# Patient Record
Sex: FEMALE | Race: WHITE | Employment: UNEMPLOYED | URBAN - METROPOLITAN AREA
[De-identification: names, ages, dates, MRNs, and addresses within clinical notes are randomized per-mention and may not be internally consistent; named-entity substitution may affect disease eponyms.]

---

## 2022-08-30 ENCOUNTER — OFFICE VISIT (OUTPATIENT)
Dept: URGENT CARE | Facility: CLINIC | Age: 67
End: 2022-08-30
Payer: MEDICARE

## 2022-08-30 VITALS
SYSTOLIC BLOOD PRESSURE: 123 MMHG | DIASTOLIC BLOOD PRESSURE: 77 MMHG | HEIGHT: 63 IN | OXYGEN SATURATION: 98 % | WEIGHT: 110.19 LBS | HEART RATE: 65 BPM | BODY MASS INDEX: 19.52 KG/M2 | RESPIRATION RATE: 12 BRPM | TEMPERATURE: 98 F

## 2022-08-30 DIAGNOSIS — R22.1 LUMP ON NECK: Primary | ICD-10-CM

## 2022-08-30 PROCEDURE — 99213 PR OFFICE/OUTPT VISIT, EST, LEVL III, 20-29 MIN: ICD-10-PCS | Mod: S$GLB,,,

## 2022-08-30 PROCEDURE — 99213 OFFICE O/P EST LOW 20 MIN: CPT | Mod: S$GLB,,,

## 2022-08-30 NOTE — PROGRESS NOTES
"Subjective:       Patient ID: Celestina Powers is a 67 y.o. female.    Vitals:  height is 5' 2.5" (1.588 m) and weight is 50 kg (110 lb 3.2 oz). Her oral temperature is 97.8 °F (36.6 °C). Her blood pressure is 123/77 and her pulse is 65. Her respiration is 12 and oxygen saturation is 98%.     Chief Complaint: Mass    Pt states "Lump on the right side of the neck x's 2 and a half weeks; no pain or tenderness, just wants it checked out."    Mass  Pertinent negatives include no abdominal pain, chest pain, chills, coughing, diaphoresis, fever, neck pain, sore throat or vertigo.     Constitution: Negative for activity change, appetite change, chills, sweating and fever.   HENT:  Negative for ear pain, sinus pain, sinus pressure and sore throat.    Neck: Negative for neck pain.   Cardiovascular:  Negative for chest pain.   Eyes:  Negative for blurred vision.   Respiratory:  Negative for chest tightness, cough and shortness of breath.    Gastrointestinal:  Negative for abdominal pain.   Skin:  Positive for lesion (right side of neck).   Neurological:  Negative for dizziness and history of vertigo.     Objective:      Physical Exam   Constitutional:  Non-toxic appearance. She does not appear ill. No distress.   Eyes: Conjunctivae are normal. Extraocular movement intact   Cardiovascular: Normal rate, normal heart sounds and normal pulses.   Pulmonary/Chest: Effort normal and breath sounds normal. No respiratory distress.   Neurological: no focal deficit. She is alert.   Skin: Skin is not diaphoretic. Capillary refill takes 2 to 3 seconds.        Psychiatric: Mood normal.     Small painless pea size lump on right side of neck that is mobile. No opening of skin, drainage, erythema or signs of infection noted.   Assessment:       1. Lump on neck          Plan:         Lump on neck  -     Ambulatory referral/consult to Dermatology       Patient presents with small pea sized lump on right side of neck x 2 weeks that is " nonpainful or tender. Referral placed to dermatology for further evaluation and treatment. No indication to I&D wound at this time. Denies recent weight loss or other symptoms associated with lump on neck.

## 2022-08-30 NOTE — PATIENT INSTRUCTIONS
Follow up with dermatology as directed. If unable to get an appointment with dermatology call clinic.     Return to clinic if you develop increased pain, redness or discomfort at site.     Keep area clean.

## 2022-08-31 ENCOUNTER — TELEPHONE (OUTPATIENT)
Dept: DERMATOLOGY | Facility: CLINIC | Age: 67
End: 2022-08-31
Payer: MEDICARE

## 2022-08-31 NOTE — TELEPHONE ENCOUNTER
----- Message from Celestina Farley sent at 8/31/2022 12:37 PM CDT -----  Contact: Pt  Type:  Same Day Appointment Request    Caller is requesting a same day appointment.  Caller declined first available appointment listed below.      Name of Caller:  Pt  When is the first available appointment?  Pt has appt on 12/30  Symptoms:  Cyst on neck  Best Call Back Number:  [phone]  Additional Information:   Please call back.  Thanks.

## 2022-08-31 NOTE — TELEPHONE ENCOUNTER
"Patient notified that was the first available appointment for a new patient and that she was on the "wait list".  She voiced understanding.    "

## 2022-09-22 ENCOUNTER — OFFICE VISIT (OUTPATIENT)
Dept: URGENT CARE | Facility: CLINIC | Age: 67
End: 2022-09-22
Payer: MEDICARE

## 2022-09-22 VITALS
TEMPERATURE: 98 F | WEIGHT: 110.38 LBS | SYSTOLIC BLOOD PRESSURE: 119 MMHG | BODY MASS INDEX: 19.56 KG/M2 | HEART RATE: 60 BPM | OXYGEN SATURATION: 98 % | HEIGHT: 63 IN | RESPIRATION RATE: 18 BRPM | DIASTOLIC BLOOD PRESSURE: 72 MMHG

## 2022-09-22 DIAGNOSIS — H61.21 RIGHT EAR IMPACTED CERUMEN: Primary | ICD-10-CM

## 2022-09-22 PROCEDURE — 69209 EAR CERUMEN REMOVAL: ICD-10-PCS | Mod: RT,S$GLB,, | Performed by: NURSE PRACTITIONER

## 2022-09-22 PROCEDURE — 99213 PR OFFICE/OUTPT VISIT, EST, LEVL III, 20-29 MIN: ICD-10-PCS | Mod: 25,S$GLB,, | Performed by: NURSE PRACTITIONER

## 2022-09-22 PROCEDURE — 99213 OFFICE O/P EST LOW 20 MIN: CPT | Mod: 25,S$GLB,, | Performed by: NURSE PRACTITIONER

## 2022-09-22 PROCEDURE — 69209 REMOVE IMPACTED EAR WAX UNI: CPT | Mod: RT,S$GLB,, | Performed by: NURSE PRACTITIONER

## 2022-09-22 RX ORDER — LORAZEPAM 1 MG/1
1 TABLET ORAL DAILY PRN
COMMUNITY
Start: 2022-08-29

## 2022-09-22 RX ORDER — IBANDRONATE SODIUM 150 MG/1
150 TABLET, FILM COATED ORAL
COMMUNITY
Start: 2022-08-29

## 2022-09-22 NOTE — PROGRESS NOTES
"Subjective:       Patient ID: Celestina Powers is a 67 y.o. female.    Vitals:  height is 5' 2.5" (1.588 m) and weight is 50.1 kg (110 lb 6.4 oz). Her temperature is 98.2 °F (36.8 °C). Her blood pressure is 119/72 and her pulse is 60. Her respiration is 18 and oxygen saturation is 98%.     Chief Complaint: Hearing Problem    Pt c/o feeling like her R ear is clogged x's about 7 days, she states she is having trouble hearing      HENT:  Positive for hearing loss.          Objective:      Physical Exam   Constitutional:  Non-toxic appearance. She does not appear ill. No distress.   HENT:   Head: Normocephalic and atraumatic.   Ears:   Right Ear: External ear normal. There is cerumen present. Decreased hearing is noted. impacted cerumen  Left Ear: External ear normal.   Nose: Nose normal.   Abdominal: Normal appearance.   Neurological: She is alert.   Skin: Skin is not diaphoretic.   Nursing note and vitals reviewed.    Ear Cerumen Removal    Date/Time: 9/22/2022 11:30 AM  Performed by: PAUL Otto  Authorized by: PAUL Otto     Consent Done?:  Yes (Verbal)  Medication Used:  Debrox  Location details:  Right ear  Procedure type: irrigation    Cerumen  Removal Results:  Cerumen completely removed  Patient tolerance:  Patient tolerated the procedure well with no immediate complications   Assessment:       1. Right ear impacted cerumen          Plan:         Right ear impacted cerumen  -     Ear wax removal                 Patient Instructions     Ear Wax Cleaning/ Removal  -  Complete full course of ear drops as prescribed   - Use Over the Counter Debrox as directed for any future ear was buildup  - Avoid cleaning ears with any foreign objects  - Follow up with here or with your PCP for no improvement of symptoms  - Follow up in the ER for any worsening of symptoms.   -  Please return here or go to the Emergency Department for any concerns or worsening of condition.  -  Please follow up " with your primary care doctor or specialist in the next 48-72hrs as needed.

## 2022-09-22 NOTE — PROCEDURES
Ear Cerumen Removal    Date/Time: 9/22/2022 11:30 AM  Performed by: PAUL Otto  Authorized by: PAUL Otto     Consent Done?:  Yes (Verbal)  Medication Used:  Debrox  Location details:  Right ear  Procedure type: irrigation    Cerumen  Removal Results:  Cerumen completely removed  Patient tolerance:  Patient tolerated the procedure well with no immediate complications

## 2022-12-14 ENCOUNTER — TELEPHONE (OUTPATIENT)
Dept: FAMILY MEDICINE | Facility: CLINIC | Age: 67
End: 2022-12-14
Payer: MEDICARE

## 2022-12-14 NOTE — TELEPHONE ENCOUNTER
----- Message from Tess Lind sent at 12/14/2022  8:36 AM CST -----  Type:  Sooner Appointment Request    Caller is requesting a sooner appointment.        Name of Caller:  pt   When is the first available appointment?  Unk?    Best Call Back Number:  [phone]   Additional Information:  she wants to est care please advise thank you